# Patient Record
Sex: FEMALE | Race: WHITE | NOT HISPANIC OR LATINO | URBAN - METROPOLITAN AREA
[De-identification: names, ages, dates, MRNs, and addresses within clinical notes are randomized per-mention and may not be internally consistent; named-entity substitution may affect disease eponyms.]

---

## 2024-07-22 ENCOUNTER — LAB (OUTPATIENT)
Dept: LAB | Facility: LAB | Age: 24
End: 2024-07-22
Payer: COMMERCIAL

## 2024-07-22 DIAGNOSIS — R53.83 OTHER FATIGUE: ICD-10-CM

## 2024-07-22 DIAGNOSIS — E55.9 VITAMIN D DEFICIENCY, UNSPECIFIED: Primary | ICD-10-CM

## 2024-07-22 DIAGNOSIS — R10.9 UNSPECIFIED ABDOMINAL PAIN: ICD-10-CM

## 2024-07-22 DIAGNOSIS — M25.50 PAIN IN UNSPECIFIED JOINT: ICD-10-CM

## 2024-07-22 DIAGNOSIS — Z13.1 ENCOUNTER FOR SCREENING FOR DIABETES MELLITUS: ICD-10-CM

## 2024-07-22 LAB
25(OH)D3 SERPL-MCNC: 34 NG/ML (ref 30–100)
EST. AVERAGE GLUCOSE BLD GHB EST-MCNC: 105 MG/DL
HBA1C MFR BLD: 5.3 %
RHEUMATOID FACT SER NEPH-ACNC: <10 IU/ML (ref 0–15)
THYROPEROXIDASE AB SERPL-ACNC: 34 IU/ML

## 2024-07-22 PROCEDURE — 83036 HEMOGLOBIN GLYCOSYLATED A1C: CPT

## 2024-07-22 PROCEDURE — 86376 MICROSOMAL ANTIBODY EACH: CPT

## 2024-07-22 PROCEDURE — 86431 RHEUMATOID FACTOR QUANT: CPT

## 2024-07-22 PROCEDURE — 36415 COLL VENOUS BLD VENIPUNCTURE: CPT

## 2024-07-22 PROCEDURE — 83516 IMMUNOASSAY NONANTIBODY: CPT

## 2024-07-22 PROCEDURE — 82306 VITAMIN D 25 HYDROXY: CPT

## 2024-07-23 LAB
GLIADIN PEPTIDE IGA SER IA-ACNC: <1 U/ML
TTG IGA SER IA-ACNC: <1 U/ML

## 2024-12-13 ENCOUNTER — OFFICE VISIT (OUTPATIENT)
Dept: ORTHOPEDIC SURGERY | Facility: CLINIC | Age: 24
End: 2024-12-13
Payer: COMMERCIAL

## 2024-12-13 ENCOUNTER — HOSPITAL ENCOUNTER (OUTPATIENT)
Dept: RADIOLOGY | Facility: CLINIC | Age: 24
Discharge: HOME | End: 2024-12-13
Payer: COMMERCIAL

## 2024-12-13 VITALS — WEIGHT: 185 LBS | BODY MASS INDEX: 31.58 KG/M2 | HEIGHT: 64 IN

## 2024-12-13 DIAGNOSIS — M79.672 LEFT FOOT PAIN: ICD-10-CM

## 2024-12-13 DIAGNOSIS — S93.602A FOOT SPRAIN, LEFT, INITIAL ENCOUNTER: Primary | ICD-10-CM

## 2024-12-13 PROCEDURE — 3008F BODY MASS INDEX DOCD: CPT | Performed by: FAMILY MEDICINE

## 2024-12-13 PROCEDURE — 1036F TOBACCO NON-USER: CPT | Performed by: FAMILY MEDICINE

## 2024-12-13 PROCEDURE — 73630 X-RAY EXAM OF FOOT: CPT | Mod: LT

## 2024-12-13 PROCEDURE — 99203 OFFICE O/P NEW LOW 30 MIN: CPT | Performed by: FAMILY MEDICINE

## 2024-12-13 NOTE — PROGRESS NOTES
History of Present Illness   Chief Complaint   Patient presents with    Left Foot - Injury     DOI:12/12/24       The patient is 24 y.o. female  here with a complaint of left foot injury.  Injury occurred yesterday walking down some stairs, says her toe got stuck and she rolled her foot with acute onset of pain, felt a cracking sensation at the same time.  She has pain over the dorsal lateral midfoot area with some mild swelling and bruising.  She says pain is tolerable, exacerbated by walking, attempting to curl her toes.  She has been icing and using Tylenol and Motrin as well as elevation.  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    Past Medical History:   Diagnosis Date    Iliotibial band syndrome, left leg 03/23/2018    Iliotibial band syndrome of left side    Iliotibial band syndrome, right leg 03/23/2018    Iliotibial band syndrome of right side    Other specified health status     No pertinent past medical history    Other viral warts 09/22/2018    Common wart    Pain in right knee     Pain in both knees, unspecified chronicity       Medication Documentation Review Audit       Reviewed by Toshia Rivas LPN (Licensed Nurse) on 12/13/24 at 1401      Medication Order Taking? Sig Documenting Provider Last Dose Status            No Medications to Display                                   No Known Allergies    Social History     Socioeconomic History    Marital status: Single     Spouse name: Not on file    Number of children: Not on file    Years of education: Not on file    Highest education level: Not on file   Occupational History    Not on file   Tobacco Use    Smoking status: Never    Smokeless tobacco: Never   Substance and Sexual Activity    Alcohol use: Yes     Comment: RARELY    Drug use: Never    Sexual activity: Not on file   Other Topics Concern    Not on file   Social History Narrative    Not on file     Social Drivers of Health     Financial  Resource Strain: Not on file   Food Insecurity: Not on file   Transportation Needs: Not on file   Physical Activity: Not on file   Stress: Not on file   Social Connections: Not on file   Intimate Partner Violence: Not on file   Housing Stability: Not on file       No past surgical history on file.       Review of Systems   GENERAL: Negative  GI: Negative  MUSCULOSKELETAL: See HPI  SKIN: Negative  NEURO:  Negative     Physical Exam:    General/Constitutional: well appearing, no distress, appears stated age  HEENT: sclera clear  Respiratory: non labored breathing  Vascular: No edema, swelling or tenderness, except as noted in detailed exam.  Integumentary: No impressive skin lesions present, except as noted in detailed exam.  Neurological:  Alert and oriented   Psychological:  Normal mood and affect.  Musculoskeletal: Normal, except as noted in detailed exam and in HPI.  Mildly antalgic gait, unassisted      Left foot: There is some very mild swelling over the dorsal lateral midfoot near the area of the cuboid, there is some overlying ecchymosis here.  She is focally tender to palpation at the dorsal lateral midfoot, no significant tenderness at the lateral ankle at the lateral malleolus, very minimal tenderness at the ATFL, nontender at the CFL.  There is no tenderness at the metatarsals.  She has relatively preserved range of motion at the ankle with some pain at endrange of dorsiflexion and plantarflexion.  No gross motor deficits present.  Sensation intact to light touch throughout, 2+ pedal pulses are present.     Imaging: X-rays of left foot obtained today and independently reviewed, no acute abnormalities are seen, no fracture identified, unremarkable foot radiographs      Assessment   1. Foot sprain, left, initial encounter        2. Left foot pain  XR foot left 3+ views            Plan: Left foot injury, symptoms consistent with midfoot sprain, x-rays negative for fracture.  Recommended conservative  management.  She will continue with over-the-counter medications, ice, elevation, she can do some gentle compression with Ace bandage.  No indication for any boot immobilization or postop shoe.  Recommend that she wear firm, comfortable tennis shoes.  Anticipate gradual improvement in symptoms over the next 1 to 2 weeks.  Activity as tolerated by symptoms.  She will follow-up as needed.